# Patient Record
(demographics unavailable — no encounter records)

---

## 2024-10-21 NOTE — HISTORY OF PRESENT ILLNESS
[de-identified] : Initial visit: Right shoulder pain - 2nd opinion  Reason: Fall  Duration: 2 months Prior studies: MRI @ [facility] - Has report no images  Symptoms: Aching  Aggravating Fx: Lifting / Limited range of motion  Alleviating Fx: Limit movement Pain level: 4/10 Pain medication: Icy hot Medical Hx: High blood pressure / High cholesterol  Surgical Hx: N/A Current Medication: Metformin  Allergies: NKA Patient is present with his daughter and wife for history and exam

## 2024-10-21 NOTE — ASSESSMENT
[FreeTextEntry1] : Discussed with patient at length symptoms and diagnosis.  Lengthy discussion with patient regarding nonoperative and operative risks and benefits as well as surgical expectations and postop protocols and expectations, including the possibility that surgery may fail to satisfactorily resolve patient's condition / symptoms.  Reviewed need for postop immobilization and ultimate recovery taking months I did review as well that the patient will have to have the MRI images sent off for review to me prior to proceeding to the OR but given the detailed description of the report and exam we may proceed with scheduling. Patient expresses understanding and patient's questions were answered.  Patient elects to proceed with surgery.

## 2024-10-21 NOTE — PHYSICAL EXAM
[de-identified] : Right Shoulder: Constitutional: The patient is healthy-appearing and in no apparent distress.   Cardiovascular System:  The capillary refill is less than 2 seconds.   Skin:  There are no skin abnormalities.  C-Spine/Neck:  Active Range of Motion: Flexion				50 Extension			60 Lateral rotation			80    Right Shoulder:  Inspection:  There is no atrophy, erythema, warmth, swelling. There is no scapular winging. There is no AC prominence.   Bony Palpation:  There is no tenderness of the clavicle. There is no tenderness of the acromioclavicular joint. There is no tenderness of the greater tuberosity.  There is no tenderness of the bicipital groove.   Soft Tissue Palpation:  There is no tenderness of the trapezius. There is no tenderness of the rhomboid. There is no tenderness of the subacromial bursa.   Active Range of Motion:  Forward flexion- 				160  Abduction-					130 External rotation at 0 degrees abduction-	80  Internal rotation at 0 degrees abduction-	80  Passive Range of Motion:  Forward flexion- 			170  Abduction-				150 External rotation at 0 deg abduction-	80  Internal rotation at 0 deg abduction-	80  Special Tests:  Hawkin's  				Positive  Neer's  				Positive  Speed's  				Negative AC cross-over 			            Negative Emmet's  				Negative  Stability:  There is no general laxity.  There is no anterior apprehension.  Strength:  Supraspinatus abduction 		4/5 External rotation at 0 deg abduction 	4/5 Internal rotation at 0 deg abduction	5/5 Scapular elevation 			5/5   Neurological System:   There is normal sensation to light touch C5-T1.   Stability:  There is no general laxity.   Psychiatric:  The patient demonstrates a normal mood and affect and is active and alert.  [de-identified] : MRI report from [facility]: There is a complete tear of the infraspinatus with high-grade partial tear of the supraspinatus as well as diffuse tendinosis

## 2024-11-06 NOTE — ASSESSMENT
[FreeTextEntry1] : IMPRESSION: 59M with PMH of HTN, HLD, DM, no AC/AP, s/p fall down stairs, p/w HA and dizziness. CTH with large 2cm L SDH with 9mm MLS s/p L crani for evac 8/1/24, s/p L MMA embo 8/5/24. Discharged on Keppra 500mg BID for seizure ppx. Keppra discontinued at last visit 8/21/24.  Repeat CT head 11/4/24 done at [facility] Radiology shows essentially resolved left subdural hematoma. No acute hemorrhage. Patient continues to do neurologically well. No headaches, unsteadiness, and other symptoms of motor, sensory, speech, or visual abnormalities. L craniotomy incision clean, dry, healed well and without drainage. The surrounding skin was normal, not erythematous, nontender, not warm and not indurated. No signs and symptoms of infection.    PLAN: No repeat imaging from our end or neurosurgical follow up unless new concerns arise No neurosurgical contraindication to shoulder/knee ortho surgery

## 2024-11-06 NOTE — REASON FOR VISIT
[Follow-Up: _____] : a [unfilled] follow-up visit [Family Member] : family member [Spouse] : spouse [FreeTextEntry1] : s/p Left craniotomy for evacuation of subdural hematoma 8/1/24 s/p Left MMA embolization 8/5/24 Reviewed CT head non contrast done 11/4/24 at [facility]

## 2024-11-06 NOTE — DATA REVIEWED
[de-identified] : CT head non contrast 11/4/24 - [facility]

## 2024-11-06 NOTE — PHYSICAL EXAM
[Clean] : clean [Dry] : dry [Well-Healed] : well-healed [No Drainage] : without drainage [Normal Skin] : normal [Person] : oriented to person [Place] : oriented to place [Time] : oriented to time [Motor Tone] : muscle tone was normal in all four extremities [Abnormal Walk] : normal gait [Erythema] : not erythematous [Tender] : not tender [Warm] : not warm [FreeTextEntry1] : Left craniotomy

## 2024-11-06 NOTE — REASON FOR VISIT
[Follow-Up: _____] : a [unfilled] follow-up visit [Family Member] : family member [Spouse] : spouse [FreeTextEntry1] : s/p Left craniotomy for evacuation of subdural hematoma 8/1/24 s/p Left MMA embolization 8/5/24 Reviewed CT head non contrast done 11/4/24 at [facility]

## 2024-11-06 NOTE — HISTORY OF PRESENT ILLNESS
[FreeTextEntry1] : JAMIE JONES is a 59 year old male with PMH of HTN, HLD, DM, no previous AC/AP, transferred from [place] 8/1/24 s/p fall down multiple stairs. He was brought to the emergency room with altered mental status and right sided weakness. CT head showed large 2.8cm left subdural hematoma with brain compression and 1.1cm of midline shift. On 8/1/24, he underwent emergent left craniotomy for evacuation. On 8/5/24, he underwent successful left MMA embolization. Discharged 8/6/24 on Keppra 500mg BID for seizure prophylaxis.  Today, patient presents for 3 month post op visit and underwent repeat CT head non contrast at [facility] on 11/4/24 due to insurance issues. He is here to review the results. Per family, patient continues to do well. Denies headaches, unsteadiness, and other symptoms of motor, sensory, speech, or visual. Denies issues with surgical incision.

## 2024-11-06 NOTE — HISTORY OF PRESENT ILLNESS
[FreeTextEntry1] : JAMIE JONES is a 59 year old male with PMH of HTN, HLD, DM, no previous AC/AP, transferred from [place] 8/1/24 s/p fall down multiple stairs. He was brought to the emergency room with altered mental status and right sided weakness. CT head showed large 2.8cm left subdural hematoma with brain compression and 1.1cm of midline shift. On 8/1/24, he underwent emergent left craniotomy for evacuation. On 8/5/24, he underwent successful left MMA embolization. Discharged 8/6/24 on Keppra 500mg BID for seizure prophylaxis.  Today, patient presents for 3 month post op visit and underwent repeat CT head non contrast at [facility] on 11/4/24 due to insurance issues. He is here to review the results. Per family, patient continues to do well. Denies headaches, unsteadiness, and other symptoms of motor, sensory, speech, or visual. Denies issues with surgical incision.

## 2024-11-06 NOTE — ASSESSMENT
[FreeTextEntry1] : IMPRESSION: 59M with PMH of HTN, HLD, DM, no AC/AP, s/p fall down stairs, p/w HA and dizziness. CTH with large 2cm L SDH with 9mm MLS s/p L crani for evac 8/1/24, s/p L MMA embo 8/5/24. Discharged on Keppra 500mg BID for seizure ppx. Keppra discontinued at last visit 8/21/24.  Repeat CT head 11/4/24 done at [facility] Radiology shows essentially resolved left subdural hematoma. No acute hemorrhage. Patient continues to do neurologically well. No headaches, unsteadiness, and other symptoms of motor, sensory, speech, or visual abnormalities. L craniotomy incision clean, dry, healed well and without drainage. The surrounding skin was normal, not erythematous, nontender, not warm and not indurated. No signs and symptoms of infection.    PLAN: No repeat imaging from our end or neurosurgical follow up unless new concerns arise No neurosurgical contraindication to shoulder/knee ortho surgery

## 2024-11-15 NOTE — PHYSICAL EXAM
[de-identified] : Right Shoulder: Constitutional: The patient is healthy-appearing and in no apparent distress.   Cardiovascular System:  The capillary refill is less than 2 seconds.   Skin:  There are no skin abnormalities.  C-Spine/Neck:  Active Range of Motion: Flexion				50 Extension			60 Lateral rotation			80    Right Shoulder:  Inspection:  There is no atrophy, erythema, warmth, swelling. There is no scapular winging. There is no AC prominence.   Bony Palpation:  There is no tenderness of the clavicle. There is no tenderness of the acromioclavicular joint. There is no tenderness of the greater tuberosity.  There is no tenderness of the bicipital groove.   Soft Tissue Palpation:  There is no tenderness of the trapezius. There is no tenderness of the rhomboid. There is no tenderness of the subacromial bursa.   Active Range of Motion:  Forward flexion- 				160  Abduction-					130 External rotation at 0 degrees abduction-	80  Internal rotation at 0 degrees abduction-	80  Passive Range of Motion:  Forward flexion- 			170  Abduction-				150 External rotation at 0 deg abduction-	80  Internal rotation at 0 deg abduction-	80  Special Tests:  Hawkin's  				Positive  Neer's  				Positive  Speed's  				Negative AC cross-over 			            Negative [place]'s  				Negative  Stability:  There is no general laxity.  There is no anterior apprehension.  Strength:  Supraspinatus abduction 		4/5 External rotation at 0 deg abduction 	4/5 Internal rotation at 0 deg abduction	5/5 Scapular elevation 			5/5   Neurological System:   There is normal sensation to light touch C5-T1.   Stability:  There is no general laxity.   Psychiatric:  The patient demonstrates a normal mood and affect and is active and alert.  [de-identified] : MRI Right shoulder;  There is a massive RTC tear with humeral head elevation

## 2024-11-15 NOTE — PHYSICAL EXAM
[de-identified] : Right Shoulder: Constitutional: The patient is healthy-appearing and in no apparent distress.   Cardiovascular System:  The capillary refill is less than 2 seconds.   Skin:  There are no skin abnormalities.  C-Spine/Neck:  Active Range of Motion: Flexion				50 Extension			60 Lateral rotation			80    Right Shoulder:  Inspection:  There is no atrophy, erythema, warmth, swelling. There is no scapular winging. There is no AC prominence.   Bony Palpation:  There is no tenderness of the clavicle. There is no tenderness of the acromioclavicular joint. There is no tenderness of the greater tuberosity.  There is no tenderness of the bicipital groove.   Soft Tissue Palpation:  There is no tenderness of the trapezius. There is no tenderness of the rhomboid. There is no tenderness of the subacromial bursa.   Active Range of Motion:  Forward flexion- 				160  Abduction-					130 External rotation at 0 degrees abduction-	80  Internal rotation at 0 degrees abduction-	80  Passive Range of Motion:  Forward flexion- 			170  Abduction-				150 External rotation at 0 deg abduction-	80  Internal rotation at 0 deg abduction-	80  Special Tests:  Hawkin's  				Positive  Neer's  				Positive  Speed's  				Negative AC cross-over 			            Negative Gosper's  				Negative  Stability:  There is no general laxity.  There is no anterior apprehension.  Strength:  Supraspinatus abduction 		4/5 External rotation at 0 deg abduction 	4/5 Internal rotation at 0 deg abduction	5/5 Scapular elevation 			5/5   Neurological System:   There is normal sensation to light touch C5-T1.   Stability:  There is no general laxity.   Psychiatric:  The patient demonstrates a normal mood and affect and is active and alert.  [de-identified] : MRI Right shoulder;  There is a massive RTC tear with humeral head elevation

## 2024-11-15 NOTE — ASSESSMENT
[FreeTextEntry1] : Discussed at length with patient and his family imaging and given severity of atrophy and retraction recommendation for reverse arthroplasty as cuff repair unlikely to be successful.  Discussed with patient at length symptoms and diagnosis.  Lengthy discussion with patient regarding nonoperative and operative risks and benefits as well as surgical expectations and postop protocols and expectations, including the possibility that surgery may fail to satisfactorily resolve patient's condition / symptoms.   Patient informed that radiologic imaging as well as physical exam are aids to helping determine treatment plans/recommendations and that intra-operative evaluation will be undertaken and any surgical treatment will take intra-operative evaluation into consideration to aid in improving the patient's symptoms/condition.  Patient expresses understanding and patient's questions were answered.  Patient ELECTS to proceed with surgery.

## 2024-11-28 NOTE — HISTORY OF PRESENT ILLNESS
[de-identified] : Status post right shoulder reverse arthroplasty [de-identified] : Patient is 13 days postop states overall his pain is adequately controlled wearing sling present with his daughter and wife for history and exam denies any paresthesias [de-identified] : On exam of the right shoulder:  Wound is clean dry intact with staples- removed.   There is mild swelling consistent with his rotation is normal sensation light touch actually radial ulnar and median nerve distribution full elbow range of motion [de-identified] : Status post reverse shoulder arthroplasty with hardware intact no evidence of dislocation [de-identified] : Status post right shoulder reverse arthroplasty [de-identified] : Reviewed at length with patient exam history and imaging and to be given physical therapy activity restrictions discussed in regards to internal rotation with extension he will follow-up in 4 weeks

## 2024-11-28 NOTE — HISTORY OF PRESENT ILLNESS
[de-identified] : Status post right shoulder reverse arthroplasty [de-identified] : Patient is 13 days postop states overall his pain is adequately controlled wearing sling present with his daughter and wife for history and exam denies any paresthesias [de-identified] : On exam of the right shoulder:  Wound is clean dry intact with staples- removed.   There is mild swelling consistent with his rotation is normal sensation light touch actually radial ulnar and median nerve distribution full elbow range of motion [de-identified] : Status post reverse shoulder arthroplasty with hardware intact no evidence of dislocation [de-identified] : Status post right shoulder reverse arthroplasty [de-identified] : Reviewed at length with patient exam history and imaging and to be given physical therapy activity restrictions discussed in regards to internal rotation with extension he will follow-up in 4 weeks

## 2024-12-05 NOTE — PROCEDURE

## 2024-12-05 NOTE — HISTORY OF PRESENT ILLNESS
[de-identified] : Last Visit: right knee pain  Reason: no falls or injuries / 3 weeks  Symptoms: weakness and painful  Pain Level: 4/10 Patient states he had viscosupplementation only 5 weeks ago with no significant improvement.  Translation assistance via Myrna WARNER

## 2024-12-05 NOTE — PHYSICAL EXAM
[de-identified] : Right knee  Constitutional:  The patient is healthy-appearing and in no apparent distress.   Gait: The patient ambulates with a normal gait and no limp.  Cardiovascular System:  The capillary refill is less than 2 seconds.   Skin:  There are no skin abnormalities.  Right Knee:   Bony Palpation:  There is tenderness of the medial joint line.  There is no tenderness of the lateral joint line. There is tenderness of the medial femoral chondyle. There is no tenderness of the lateral femoral chondyle. There is no tenderness of the tibial tubercle. There is no tenderness of the superior patella. There is no tenderness of the inferior patella. There is tenderness of the medial patellar facet. There is tenderness of the lateral patellar facet.  Soft Tissue Palpation:  There is no tenderness of the medial retinaculum. There is no tenderness of the lateral retinaculum. There is no tenderness of the quadriceps tendon. There is no tenderness of the patella tendon. There is no tenderness of the ITB. There is no tenderness of the pes anserine.  Active Range of Motion:  The range of motion at the knee actively and passively is full.   Special Tests:  There is a negative Apley. There is a negative Steinmanns.  There is a negative Lachman and Anterior Drawer. There is a negative Posterior Drawer.   There is no varus or valgus laxity.  Strength:  There is 5/5 hip flexion and 5/5 knee flexion and extension.    Psychiatric:  The patient demonstrates a normal mood and affect and is active and alert   [de-identified] : X-ray right knee: There is severe medial and patellofemoral arthritis with mild to moderate lateral compartment arthritis

## 2024-12-05 NOTE — PROCEDURE

## 2024-12-05 NOTE — PROCEDURE

## 2024-12-05 NOTE — PROCEDURE

## 2024-12-05 NOTE — PHYSICAL EXAM
[de-identified] : Right knee  Constitutional:  The patient is healthy-appearing and in no apparent distress.   Gait: The patient ambulates with a normal gait and no limp.  Cardiovascular System:  The capillary refill is less than 2 seconds.   Skin:  There are no skin abnormalities.  Right Knee:   Bony Palpation:  There is tenderness of the medial joint line.  There is no tenderness of the lateral joint line. There is tenderness of the medial femoral chondyle. There is no tenderness of the lateral femoral chondyle. There is no tenderness of the tibial tubercle. There is no tenderness of the superior patella. There is no tenderness of the inferior patella. There is tenderness of the medial patellar facet. There is tenderness of the lateral patellar facet.  Soft Tissue Palpation:  There is no tenderness of the medial retinaculum. There is no tenderness of the lateral retinaculum. There is no tenderness of the quadriceps tendon. There is no tenderness of the patella tendon. There is no tenderness of the ITB. There is no tenderness of the pes anserine.  Active Range of Motion:  The range of motion at the knee actively and passively is full.   Special Tests:  There is a negative Apley. There is a negative Steinmanns.  There is a negative Lachman and Anterior Drawer. There is a negative Posterior Drawer.   There is no varus or valgus laxity.  Strength:  There is 5/5 hip flexion and 5/5 knee flexion and extension.    Psychiatric:  The patient demonstrates a normal mood and affect and is active and alert   [de-identified] : X-ray right knee: There is severe medial and patellofemoral arthritis with mild to moderate lateral compartment arthritis

## 2024-12-05 NOTE — PHYSICAL EXAM
[de-identified] : Right knee  Constitutional:  The patient is healthy-appearing and in no apparent distress.   Gait: The patient ambulates with a normal gait and no limp.  Cardiovascular System:  The capillary refill is less than 2 seconds.   Skin:  There are no skin abnormalities.  Right Knee:   Bony Palpation:  There is tenderness of the medial joint line.  There is no tenderness of the lateral joint line. There is tenderness of the medial femoral chondyle. There is no tenderness of the lateral femoral chondyle. There is no tenderness of the tibial tubercle. There is no tenderness of the superior patella. There is no tenderness of the inferior patella. There is tenderness of the medial patellar facet. There is tenderness of the lateral patellar facet.  Soft Tissue Palpation:  There is no tenderness of the medial retinaculum. There is no tenderness of the lateral retinaculum. There is no tenderness of the quadriceps tendon. There is no tenderness of the patella tendon. There is no tenderness of the ITB. There is no tenderness of the pes anserine.  Active Range of Motion:  The range of motion at the knee actively and passively is full.   Special Tests:  There is a negative Apley. There is a negative Steinmanns.  There is a negative Lachman and Anterior Drawer. There is a negative Posterior Drawer.   There is no varus or valgus laxity.  Strength:  There is 5/5 hip flexion and 5/5 knee flexion and extension.    Psychiatric:  The patient demonstrates a normal mood and affect and is active and alert   [de-identified] : X-ray right knee: There is severe medial and patellofemoral arthritis with mild to moderate lateral compartment arthritis

## 2024-12-05 NOTE — HISTORY OF PRESENT ILLNESS
[de-identified] : Last Visit: right knee pain  Reason: no falls or injuries / 3 weeks  Symptoms: weakness and painful  Pain Level: 4/10 Patient states he had viscosupplementation only 5 weeks ago with no significant improvement.  Translation assistance via Myrna WARNER

## 2024-12-05 NOTE — HISTORY OF PRESENT ILLNESS
[de-identified] : Last Visit: right knee pain  Reason: no falls or injuries / 3 weeks  Symptoms: weakness and painful  Pain Level: 4/10 Patient states he had viscosupplementation only 5 weeks ago with no significant improvement.  Translation assistance via Myrna WARNER

## 2024-12-05 NOTE — PHYSICAL EXAM
[de-identified] : Right knee  Constitutional:  The patient is healthy-appearing and in no apparent distress.   Gait: The patient ambulates with a normal gait and no limp.  Cardiovascular System:  The capillary refill is less than 2 seconds.   Skin:  There are no skin abnormalities.  Right Knee:   Bony Palpation:  There is tenderness of the medial joint line.  There is no tenderness of the lateral joint line. There is tenderness of the medial femoral chondyle. There is no tenderness of the lateral femoral chondyle. There is no tenderness of the tibial tubercle. There is no tenderness of the superior patella. There is no tenderness of the inferior patella. There is tenderness of the medial patellar facet. There is tenderness of the lateral patellar facet.  Soft Tissue Palpation:  There is no tenderness of the medial retinaculum. There is no tenderness of the lateral retinaculum. There is no tenderness of the quadriceps tendon. There is no tenderness of the patella tendon. There is no tenderness of the ITB. There is no tenderness of the pes anserine.  Active Range of Motion:  The range of motion at the knee actively and passively is full.   Special Tests:  There is a negative Apley. There is a negative Steinmanns.  There is a negative Lachman and Anterior Drawer. There is a negative Posterior Drawer.   There is no varus or valgus laxity.  Strength:  There is 5/5 hip flexion and 5/5 knee flexion and extension.    Psychiatric:  The patient demonstrates a normal mood and affect and is active and alert   [de-identified] : X-ray right knee: There is severe medial and patellofemoral arthritis with mild to moderate lateral compartment arthritis

## 2024-12-05 NOTE — ASSESSMENT
[FreeTextEntry1] : Discussed at length with patient via  history exam and imaging at this time he elects cortisone injection home exercises and anti-inflammatory.  Patient aware that ultimately he may require total knee replacement

## 2024-12-05 NOTE — HISTORY OF PRESENT ILLNESS
[de-identified] : Last Visit: right knee pain  Reason: no falls or injuries / 3 weeks  Symptoms: weakness and painful  Pain Level: 4/10 Patient states he had viscosupplementation only 5 weeks ago with no significant improvement.  Translation assistance via Myrna WARNER

## 2025-01-02 NOTE — HISTORY OF PRESENT ILLNESS
[de-identified] : Status post reverse shoulder arthroplasty with hardware intact no evidence of dislocation [de-identified] : Status post right shoulder reverse arthroplasty [de-identified] : Patient is presenting for follow-up evaluation of the right shoulder and states overall he is feeling much improved with physical therapy and home exercises states persistent intermittent right knee pain as well much improved after cortisone injection [de-identified] : On exam of the right shoulder wound is well-healed active forward flexion to 165 degrees external rotation actively to 50 degrees with full internal rotation normal sensation light touch throughout On evaluation of the right knee there is minimal tenderness to the medial lateral joint line as well as patellofemoral facets range of motion 0-1 30 with pain at end flexion 5 out of 5 knee flexion and extension [de-identified] : Status post right shoulder reverse arthroplasty RIGHT knee arthritis [de-identified] : Discussed at length with patient overall clinical improvement at the shoulder for the shoulder is to follow-up in 6 months for the knee he would like to consider viscosupplementation he understands that ultimately he may require total knee replacement for pain relief

## 2025-01-02 NOTE — HISTORY OF PRESENT ILLNESS
[de-identified] : Status post reverse shoulder arthroplasty with hardware intact no evidence of dislocation [de-identified] : Status post right shoulder reverse arthroplasty [de-identified] : Patient is presenting for follow-up evaluation of the right shoulder and states overall he is feeling much improved with physical therapy and home exercises states persistent intermittent right knee pain as well much improved after cortisone injection [de-identified] : On exam of the right shoulder wound is well-healed active forward flexion to 165 degrees external rotation actively to 50 degrees with full internal rotation normal sensation light touch throughout On evaluation of the right knee there is minimal tenderness to the medial lateral joint line as well as patellofemoral facets range of motion 0-1 30 with pain at end flexion 5 out of 5 knee flexion and extension [de-identified] : Status post right shoulder reverse arthroplasty RIGHT knee arthritis [de-identified] : Discussed at length with patient overall clinical improvement at the shoulder for the shoulder is to follow-up in 6 months for the knee he would like to consider viscosupplementation he understands that ultimately he may require total knee replacement for pain relief

## 2025-01-16 NOTE — HISTORY OF PRESENT ILLNESS
[de-identified] : JAMIE JONES  is an 59 year-old male presents today with a chief complaint of right knee pain. Patient describes onset over the last number of years, but it may be going on for longer.   Patient points to the medial lateral aspect of knee as maximum point of tenderness. Pain is typically 4/10 in severity, dull and achy but sometimes sharp in quality with certain activities. Symptoms are exacerbated by activity, or even walking/standing. They are improved with rest, and ice. Patient denies any radiation of symptoms beyond the surrounding area. Hip and ankle appear to be largely unrelated.   To address the pathology, they have tried OTC medications/NSAIDs with some relief, even though short lasting. Injections has been attempted. Exercise therapy has had only temporary relief but has helped maintain greater than 50 % range of motion. Things have gotten bad enough that patient will need assistive devices like the banister for going up and down stairs. They may need a cane.   At this point the patient is quite frustrated by their condition. As duration of symptoms exceeds years, they are here for further evaluation and discussion of possible diagnoses and management. They deny any further trauma. No fever or chills, no signs of infection. Today, patient does not state any other associated signs or complains outside of those described.   A complete review of symptoms as well as past medical/surgical history, medications, allergies, social and family history, and other details of HPI and exam were reviewed per first visit intake form and updated accordingly. Additional and more relevant details are noted in further detail today.

## 2025-01-16 NOTE — PHYSICAL EXAM
[de-identified] : General Appearance / Station: Well developed, well nourished, in no acute distress  Orientation: Oriented to person, place, and time Gait & Station: Ambulates without assistive device Neurologic: Normal leg sensation  Cardiovascular: Warm extremity  Lymphatics: No lymphedema  Generalized Ligament Laxity: Normal  Stiffness: Normal   LUMBAR SPINE: Nontender  at lumbar spine Straight leg raise: Negative  Motor: 5/5 motor L2-S1 Sensation: Intact  L2-S1  RIGHT HIP: Range of motion: Painless  internal and external rotation of the hip. Strength: Within Normal Limits  Palpation: Nontender  at greater trochanter. Nontender  at SI joint Stinchfield: Negative  FADIR: Negative  RONNY: Negative   SYMPTOMATIC RIGHT KNEE: Alignment: VARUS  Skin: normal Effusion: none . Quadriceps: normal . Range of motion: symmetrical but painful . PF crepitus: 1+. PF apprehension: none . Patella / Patella Tendon: nontender . Lachman's: negative  Valgus @ 30: negative. Varus @ 30: negative. Posterior drawer: negative. Palpation: TENDER AT MEDIAL LATERAL JOINT LINE. Meniscus signs: MEDIAL JOINT LINE  [de-identified] : Previous x-rays reviewed which show severe right knee arthritis

## 2025-01-16 NOTE — DISCUSSION/SUMMARY
[de-identified] : JAMIE JONES  is an 59 year-old male with bone on bone arthritis of their right knee. The patient and I reviewed their chief complaint, history of present illness, radiology findings, differential diagnosis and the pros, cons, alternatives, risks, and benefits of further conservative treatment versus operative intervention. Based upon the patients continued symptoms and failure to respond to conservative treatment for greater than three months and after a shared decision making process, the patient would like to proceed with an elective Right Total Knee Replacement.  As a result of the replacement, the patients specific functional goal is to walk with less pain. A long discussion took place with the patient describing what a total knee replacement is and what the procedure would entail. I explained that this is a major surgery with significant procedure risk factors. The benefits of surgery were discussed with the patient including the potential for improving his/her current clinical condition through operative intervention. Alternatives to surgical intervention including continued conservative management were also discussed in detail.     The patient  clearly understand that there is no guarantee of improvement with either treatment option, both carry risks including worsening pain. They also understand the indications and limitations of surgery and the need for post operative rehabilitation / recovery. Healing times vary greatly among patients and this was discussed. All patient questions were answered satisfactorily. Risks, benefits, and alternatives to surgery have been discussed with the patient including but not limited to bleeding and need for blood transfusion, infection, intraoperative or postoperative fracture, hardware or implant failure, neurovascular injury, thigh or knee numbness, chronic pain and scarring, stiffness, chronic tendonitis or swelling, dislocation, leg-length discrepancy, the potential need for future surgery, DVT, PE, heart attack, stroke and death. The patient was told that we will take steps to minimize these risks by using sterile technique, antibiotics and DVT prophylaxis when appropriate and follow the patient postoperatively in the office setting to monitor progress but we cannot eliminate these risks completely. The possibility of recurrent pain, no improvement in pain and actual worsening of pain were also discussed with the patient.   The patient demonstrates understanding and wishes to proceed.After our long discussion the final decision for surgery was made today. The patient will sit down with the surgical coordinator to discuss clearances and complete necessary paperwork.   During the shared decision making process, educational tools including implant model and patient x-rays were used to discuss implant type and function. Yanelis, Enovis/ DJO and Jerardo Biomet implants are the implants most comfortable utilizing in my primary total knee replacements and the implant I am planning for their total knee. If the patient wishes to utilize a different implant brand or type for their total knee they may obtain an additional surgical opinion from a surgeon utilizing that brand of implant. The hospitalization and post-operative care and rehabilitation were also discussed. The use of perioperative antibiotics and DVT prophylaxis were discussed. The patient was also advised of risks related to the medical comorbidities and elevated body mass index (BMI). The discharge plan of care focused on the patient going home following surgery and the importance to remove trip and fall hazards about the house prior to admission so that they decrease the risk of fall once discharged as a fall can be catastrophic after total joint replacement. I encouraged the patient to make the necessary arrangements to have someone stay with them when they are discharged home.  Home physical therapy will commence following discharge provided it is appropriate and covered by their health insurance benefit plan.    All questions were answered to the satisfaction of the patient. The patient agreed to the plan of care as well as the use of implants in their total joint replacement.   The patient was advised that they will require a medical preoperative risk evaluation by their PCP . Further medical subspecialty clearances may be indicated if felt needed by their PCP.

## 2025-03-03 NOTE — RESULTS/DATA
[] : results reviewed [de-identified] : Acceptable for the OR [de-identified] : Acceptable for the OR [de-identified] : Acceptable for the OR [de-identified] : NSR

## 2025-03-03 NOTE — PHYSICAL EXAM
[Normal Sclera/Conjunctiva] : normal sclera/conjunctiva [Normal Outer Ear/Nose] : the outer ears and nose were normal in appearance [Normal Oropharynx] : the oropharynx was normal [Normal TMs] : both tympanic membranes were normal [No JVD] : no jugular venous distention [No Lymphadenopathy] : no lymphadenopathy [Normal Rate] : normal rate  [Regular Rhythm] : with a regular rhythm [Normal S1, S2] : normal S1 and S2 [No Abdominal Bruit] : a ~M bruit was not heard ~T in the abdomen [No Edema] : there was no peripheral edema [Normal Supraclavicular Nodes] : no supraclavicular lymphadenopathy [Normal Anterior Cervical Nodes] : no anterior cervical lymphadenopathy [Grossly Normal Strength/Tone] : grossly normal strength/tone [No Focal Deficits] : no focal deficits [Normal Gait] : normal gait [Normal Affect] : the affect was normal [Alert and Oriented x3] : oriented to person, place, and time [Normal Mood] : the mood was normal [Normal] : soft, non-tender, non-distended, no masses palpated, no HSM and normal bowel sounds

## 2025-03-03 NOTE — CURRENT MEDS
[Takes medication as prescribed] : takes [None] : Patient does not have any barriers to medication adherence [Yes] : Reviewed medication list for presence of high-risk medications. [FreeTextEntry1] : NONE

## 2025-03-03 NOTE — ASSESSMENT
[Patient Optimized for Surgery] : Patient optimized for surgery [No Further Testing Recommended] : no further testing recommended [As per surgery] : as per surgery [FreeTextEntry4] : JAMIE JONES was seen and examined. Labs and EKG reviewed and deemed acceptable for the OR. There are no clinical indicators suggestive of active cardiac disease. He has been briefed on Skin cleansing protocol and Nasal Bactroban for + MSSA.  There are no medical contraindications to planned procedure. He is optimized and able to proceed directly to the OR. [FreeTextEntry7] : Metformin to be held the Day of procedure. Continue all other  medications.

## 2025-03-03 NOTE — HISTORY OF PRESENT ILLNESS
[No Pertinent Cardiac History] : no history of aortic stenosis, atrial fibrillation, coronary artery disease, recent myocardial infarction, or implantable device/pacemaker [Asthma] : asthma [No Adverse Anesthesia Reaction] : no adverse anesthesia reaction in self or family member [Diabetes] : diabetes [Moderate (4-6 METs)] : Moderate (4-6 METs) [Family Member] : family member [(Patient denies any chest pain, claudication, dyspnea on exertion, orthopnea, palpitations or syncope)] : Patient denies any chest pain, claudication, dyspnea on exertion, orthopnea, palpitations or syncope [COPD] : no COPD [Sleep Apnea] : no sleep apnea [Smoker] : not a smoker [Chronic Anticoagulation] : no chronic anticoagulation [Chronic Kidney Disease] : no chronic kidney disease [FreeTextEntry1] : RIGHT KNEE replacement [FreeTextEntry2] : 3/10/25 [FreeTextEntry3] : Dr. Iverson [FreeTextEntry4] : JAMIE JONES is a 59 year M who presents today for medical clearance prior to knee replacement. The procedure is planned for 3/10/25 @ [facility]. He feels well at today's visit and offers no complaints. [FreeTextEntry5] : [facility]- not active since his teenage years.

## 2025-03-03 NOTE — REVIEW OF SYSTEMS
done [Patient Intake Form Reviewed] : Patient intake form was reviewed [Memory Loss] : memory loss [Negative] : Heme/Lymph [Itching] : itching [Skin Rash] : skin rash

## 2025-04-28 NOTE — PHYSICAL EXAM
[Normal Sclera/Conjunctiva] : normal sclera/conjunctiva [Normal Outer Ear/Nose] : the outer ears and nose were normal in appearance [Normal] : no respiratory distress, lungs were clear to auscultation bilaterally and no accessory muscle use [Normal Rate] : normal rate  [Regular Rhythm] : with a regular rhythm [Normal S1, S2] : normal S1 and S2 [No Edema] : there was no peripheral edema [No CVA Tenderness] : no CVA  tenderness [No Spinal Tenderness] : no spinal tenderness [Grossly Normal Strength/Tone] : grossly normal strength/tone [Speech Grossly Normal] : speech grossly normal [Normal Affect] : the affect was normal [Normal Mood] : the mood was normal

## 2025-04-28 NOTE — HISTORY OF PRESENT ILLNESS
[de-identified] : JAMIE JONES is a 59 year M who presents today to f/u on  the anemia that he has had documented on labs since at least 8/2024. The patient had been unaware of the anemia until I mentioned it to him last month at his 1st visit with me as a Pre-OP. He states that he had been following regularly with his PCP in [place], [place] for his T2DM and HLD and had never been notified on an anemia.  He has never gone for colonoscopy.  He takes Motrin and Mobic post surgery for inflammation.

## 2025-04-28 NOTE — HEALTH RISK ASSESSMENT
[0] : 2) Feeling down, depressed, or hopeless: Not at all (0) [PHQ-2 Negative - No further assessment needed] : PHQ-2 Negative - No further assessment needed [Never] : Never [BJK2Ccydl] : 0

## 2025-05-29 NOTE — HISTORY OF PRESENT ILLNESS
[de-identified] : JAMIE JONES  is an 59 year-old male presents today status post right total knee arthroplasty on 3/10/2025 for followup. The patient is living at home. The patient has maintained weight bearing as tolerated. The patient is ambulating with no assistive device with physical therapy. The patient has weaned off all narcotic pain medicine. The patient is progressing well and is happy with the progress.   No fever, chills, or signs of infection. Today, patient does not state any other associated signs or complaints outside of those described. The patient presents for postoperative followup.

## 2025-05-29 NOTE — PHYSICAL EXAM
[de-identified] : General Appearance / Station: Well developed, well nourished, in no acute distress Orientation: Oriented to person, place, and time Gait & Station: Ambulates with assistive device Neurologic: Normal leg sensation Cardiovascular: Warm extremity Lymphatics: No lymphedema   OPERATIVE KNEE Skin: incision well-healing.  No erythema, warmth or tenderness. Range of motion: 0-120 with opposite side Strength: Within Normal Limits. Able to straight leg raise                                                                  Neurovascular Exam: Able to wiggle toes and dorsiflex/ plantarflex ankle. Foot warm, well perfused      t [de-identified] : Imaging: Three views of the right knee show satisfactory placement of a right cementless total knee arthroplasty. There are no changes in alignment of hardware and no signs of radiographic failure compared to previous radiographs.

## 2025-05-29 NOTE — DISCUSSION/SUMMARY
[de-identified] : Now s/p right TKA on 3/10/2025 for follow-up. Overall doing well.  Follow-up in 3 months for repeat evaluation   At this point we have suggested continued exercises and formal physical therapy with Rx given. Appropriate instructions regarding further care, restrictions, and further recovery has been given. They know to complete the prescribed four weeks of DVT prophylaxis.   We have also counseled the patient to avoid any dental procedures for the first three months postoperatively.. We remain available for any further questions and concerns and instructed patient that we would like to see them if any concerns for infection including fevers, redness, or increased swelling.

## 2025-07-03 NOTE — PHYSICAL EXAM
[No Acute Distress] : no acute distress [Well Nourished] : well nourished [Well Developed] : well developed [Well-Appearing] : well-appearing [Normal Sclera/Conjunctiva] : normal sclera/conjunctiva [PERRL] : pupils equal round and reactive to light [EOMI] : extraocular movements intact [Normal Outer Ear/Nose] : the outer ears and nose were normal in appearance [Normal Oropharynx] : the oropharynx was normal [No JVD] : no jugular venous distention [No Lymphadenopathy] : no lymphadenopathy [Supple] : supple [Thyroid Normal, No Nodules] : the thyroid was normal and there were no nodules present [No Respiratory Distress] : no respiratory distress  [No Accessory Muscle Use] : no accessory muscle use [Clear to Auscultation] : lungs were clear to auscultation bilaterally [Normal Rate] : normal rate  [Regular Rhythm] : with a regular rhythm [Normal S1, S2] : normal S1 and S2 [No Carotid Bruits] : no carotid bruits [No Abdominal Bruit] : a ~M bruit was not heard ~T in the abdomen [Pedal Pulses Present] : the pedal pulses are present [No Edema] : there was no peripheral edema [No Palpable Aorta] : no palpable aorta [No Extremity Clubbing/Cyanosis] : no extremity clubbing/cyanosis [Soft] : abdomen soft [Non Tender] : non-tender [Non-distended] : non-distended [No Masses] : no abdominal mass palpated [No HSM] : no HSM [Normal Bowel Sounds] : normal bowel sounds [Normal Posterior Cervical Nodes] : no posterior cervical lymphadenopathy [Normal Anterior Cervical Nodes] : no anterior cervical lymphadenopathy [Coordination Grossly Intact] : coordination grossly intact [No Focal Deficits] : no focal deficits [Normal Gait] : normal gait [Deep Tendon Reflexes (DTR)] : deep tendon reflexes were 2+ and symmetric [Speech Grossly Normal] : speech grossly normal [Normal Affect] : the affect was normal [Alert and Oriented x3] : oriented to person, place, and time [Normal Mood] : the mood was normal [Normal Insight/Judgement] : insight and judgment were intact [de-identified] : multiple vesiclar lesion in patches upper ext b/l R>L  some lesion on b/l calf area adn left lower abdomen

## 2025-07-03 NOTE — HISTORY OF PRESENT ILLNESS
[Rash (Location) ___] : rash on [unfilled] [Moderate] : moderate [___ Days ago] : [unfilled] days ago [Constant] : constant [Worsening] : worsening [de-identified] : arms, legs [FreeTextEntry2] : itchy [FreeTextEntry8] : denies using OTC meds

## 2025-07-03 NOTE — PLAN
[FreeTextEntry1] : Derm  - Rhus dermatitis - Poison IVY - Advised avoidance of the plant -  rxn for betamethasone and medrol dose pack Advised strict diet - low glycemic diet  Advised to avoid hot showers and spicy food which can increase itchiness Avoid itching the lesions  Can use OTC soap   DM- Advised low glycemic diet  Monitor BG Continue w/ metformin  I spent 30 Minutes with the patient, half of which we discussed finding on physical exam and coordinated care.  As well as reviewed my plans and follow ups. Dragon speech recognition software was used to create portions of this document.  An attempt at proofreading has been made to minimize errors please call if any questions arise.

## 2025-07-03 NOTE — CURRENT MEDS
Per patient's pump, blood sugar of 349. Bolus given of 11.9, will recheck blood sugar at 0230 and update Hospitalist with results per Dr. Ellison.   [Takes medication as prescribed] : takes [None] : Patient does not have any barriers to medication adherence [Yes] : Reviewed medication list for presence of high-risk medications. [Opioids] : opioids

## 2025-07-03 NOTE — ASSESSMENT
[FreeTextEntry1] : Mr. JONES is a 60 year-old male, with a past medical history as noted above, who present to the office today for rhus dermatitis

## 2025-07-03 NOTE — COUNSELING
[Encouraged to increase physical activity] : Encouraged to increase physical activity [Good understanding] : Patient has a good understanding of disease, goals and obesity follow-up plan [FreeTextEntry2] : ada

## 2025-07-29 NOTE — PHYSICAL EXAM
[Normal Sclera/Conjunctiva] : normal sclera/conjunctiva [Normal Outer Ear/Nose] : the outer ears and nose were normal in appearance [Normal] : normal rate, regular rhythm, normal S1 and S2 and no murmur heard [No Focal Deficits] : no focal deficits [Speech Grossly Normal] : speech grossly normal [Normal Mood] : the mood was normal [No Edema] : there was no peripheral edema [Alert and Oriented x3] : oriented to person, place, and time [de-identified] : Right knee with healed surgical incision

## 2025-07-29 NOTE — HISTORY OF PRESENT ILLNESS
[de-identified] : JAMIE JONES is a 60 year M with HLD, HTN, T2 DM and anemia who presents today for f/u. He needs med refills and updated lab orders. He is also requesting that I place a urology referral for complaints of testicular discomfort.

## 2025-07-29 NOTE — HISTORY OF PRESENT ILLNESS
[de-identified] : JAMIE JONES is a 60 year M with HLD, HTN, T2 DM and anemia who presents today for f/u. He needs med refills and updated lab orders. He is also requesting that I place a urology referral for complaints of testicular discomfort.

## 2025-07-29 NOTE — PHYSICAL EXAM
[Normal Sclera/Conjunctiva] : normal sclera/conjunctiva [Normal Outer Ear/Nose] : the outer ears and nose were normal in appearance [Normal] : normal rate, regular rhythm, normal S1 and S2 and no murmur heard [No Focal Deficits] : no focal deficits [Speech Grossly Normal] : speech grossly normal [Normal Mood] : the mood was normal [No Edema] : there was no peripheral edema [Alert and Oriented x3] : oriented to person, place, and time [de-identified] : Right knee with healed surgical incision